# Patient Record
Sex: MALE | Race: WHITE | NOT HISPANIC OR LATINO | URBAN - NONMETROPOLITAN AREA
[De-identification: names, ages, dates, MRNs, and addresses within clinical notes are randomized per-mention and may not be internally consistent; named-entity substitution may affect disease eponyms.]

---

## 2019-01-02 ENCOUNTER — IMPORTED ENCOUNTER (OUTPATIENT)
Dept: URBAN - NONMETROPOLITAN AREA CLINIC 1 | Facility: CLINIC | Age: 74
End: 2019-01-02

## 2019-01-02 PROBLEM — H16.223: Noted: 2020-01-14

## 2019-01-02 PROBLEM — Z96.1: Noted: 2019-01-02

## 2019-01-02 PROBLEM — H52.221: Noted: 2019-01-02

## 2019-01-02 PROBLEM — H52.13: Noted: 2019-01-02

## 2019-01-02 PROBLEM — H52.4: Noted: 2019-01-02

## 2019-01-02 PROCEDURE — 99214 OFFICE O/P EST MOD 30 MIN: CPT

## 2019-01-02 PROCEDURE — 92015 DETERMINE REFRACTIVE STATE: CPT

## 2019-01-02 NOTE — PATIENT DISCUSSION
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

## 2020-01-14 ENCOUNTER — IMPORTED ENCOUNTER (OUTPATIENT)
Dept: URBAN - NONMETROPOLITAN AREA CLINIC 1 | Facility: CLINIC | Age: 75
End: 2020-01-14

## 2020-01-14 PROCEDURE — 92014 COMPRE OPH EXAM EST PT 1/>: CPT

## 2020-01-14 NOTE — PATIENT DISCUSSION
P/C IOL OU Open Caps-  discussed findings w/patient-  pt doing well and stable -  Hx of YAG PC OU -  continue to monitor yearly or prnDES OU-  discussed findings w/patient-  no changes noted at this time-  continue AT's PRN OU-  RTC 1 year or prn; 's Notes: MR 1/14/2020DFE 1/10/2020

## 2021-01-12 ENCOUNTER — IMPORTED ENCOUNTER (OUTPATIENT)
Dept: URBAN - NONMETROPOLITAN AREA CLINIC 1 | Facility: CLINIC | Age: 76
End: 2021-01-12

## 2021-01-12 ENCOUNTER — PREPPED CHART (OUTPATIENT)
Dept: URBAN - NONMETROPOLITAN AREA CLINIC 4 | Facility: CLINIC | Age: 76
End: 2021-01-12

## 2021-01-12 PROCEDURE — 92015 DETERMINE REFRACTIVE STATE: CPT

## 2021-01-12 PROCEDURE — 92014 COMPRE OPH EXAM EST PT 1/>: CPT

## 2021-01-12 NOTE — PATIENT DISCUSSION
Pseudophakia w/Open Capsules OU-  discussed findings w/patient-  pt doing well and stable -  Hx of YAG PC OU -  continue to monitor yearly or prnDES OU-  discussed findings w/patient-  no changes noted at this time-  continue AT's PRN OU-  RTC 1 year or prnSimple Myopia OU w/Presbyopia-  discussed findings w/patient-  new spectacle Rx issued-  continue to monitor yearly or prn; 's Notes: MR 1/12/2021DFE 1/12/2021

## 2021-03-31 PROBLEM — H52.4: Noted: 2021-03-31

## 2021-03-31 PROBLEM — Z96.1: Noted: 2019-01-02

## 2021-03-31 PROBLEM — H52.13: Noted: 2021-03-31

## 2021-03-31 PROBLEM — H16.223: Noted: 2020-01-14

## 2022-02-14 ASSESSMENT — VISUAL ACUITY
OD_SC: 20/30
OU_SC: 20/40
OS_SC: 20/25+2

## 2022-02-14 ASSESSMENT — TONOMETRY
OD_IOP_MMHG: 15
OS_IOP_MMHG: 14

## 2022-02-22 ENCOUNTER — COMPREHENSIVE EXAM (OUTPATIENT)
Dept: URBAN - NONMETROPOLITAN AREA CLINIC 4 | Facility: CLINIC | Age: 77
End: 2022-02-22

## 2022-02-22 DIAGNOSIS — H52.13: ICD-10-CM

## 2022-02-22 DIAGNOSIS — Z96.1: ICD-10-CM

## 2022-02-22 DIAGNOSIS — H16.223: ICD-10-CM

## 2022-02-22 DIAGNOSIS — H52.4: ICD-10-CM

## 2022-02-22 PROCEDURE — 92015 DETERMINE REFRACTIVE STATE: CPT

## 2022-02-22 PROCEDURE — 92014 COMPRE OPH EXAM EST PT 1/>: CPT

## 2022-02-22 ASSESSMENT — TONOMETRY
OS_IOP_MMHG: 16
OD_IOP_MMHG: 14

## 2022-02-22 ASSESSMENT — VISUAL ACUITY
OD_SC: 20/20-2
OS_SC: 20/25-1

## 2022-04-09 ASSESSMENT — TONOMETRY
OS_IOP_MMHG: 13
OS_IOP_MMHG: 14
OS_IOP_MMHG: 14
OD_IOP_MMHG: 15
OD_IOP_MMHG: 15
OD_IOP_MMHG: 14

## 2022-04-09 ASSESSMENT — VISUAL ACUITY
OU_CC: 20/20
OS_CC: 20/25-
OU_SC: 20/40
OU_SC: J1
OD_CC: 20/20-2
OU_SC: 20/25-2
OD_CC: 20/20 D
OD_CC: 20/30+
OS_CC: 20/25+2

## 2024-07-08 ENCOUNTER — COMPREHENSIVE EXAM (OUTPATIENT)
Dept: URBAN - NONMETROPOLITAN AREA CLINIC 4 | Facility: CLINIC | Age: 79
End: 2024-07-08

## 2024-07-08 DIAGNOSIS — Z96.1: ICD-10-CM

## 2024-07-08 DIAGNOSIS — H52.13: ICD-10-CM

## 2024-07-08 DIAGNOSIS — H43.813: ICD-10-CM

## 2024-07-08 DIAGNOSIS — H52.223: ICD-10-CM

## 2024-07-08 DIAGNOSIS — H52.4: ICD-10-CM

## 2024-07-08 DIAGNOSIS — H16.223: ICD-10-CM

## 2024-07-08 PROCEDURE — 92014 COMPRE OPH EXAM EST PT 1/>: CPT

## 2024-07-08 PROCEDURE — 92015 DETERMINE REFRACTIVE STATE: CPT

## 2024-07-08 ASSESSMENT — TONOMETRY
OD_IOP_MMHG: 14
OS_IOP_MMHG: 14

## 2024-07-08 ASSESSMENT — VISUAL ACUITY
OS_SC: 20/20
OD_SC: 20/30-2